# Patient Record
Sex: FEMALE | URBAN - METROPOLITAN AREA
[De-identification: names, ages, dates, MRNs, and addresses within clinical notes are randomized per-mention and may not be internally consistent; named-entity substitution may affect disease eponyms.]

---

## 2019-07-23 ENCOUNTER — APPOINTMENT (RX ONLY)
Dept: URBAN - METROPOLITAN AREA CLINIC 116 | Facility: CLINIC | Age: 74
Setting detail: DERMATOLOGY
End: 2019-07-23

## 2019-07-23 DIAGNOSIS — L80 VITILIGO: ICD-10-CM

## 2019-07-23 DIAGNOSIS — Z71.89 OTHER SPECIFIED COUNSELING: ICD-10-CM

## 2019-07-23 PROBLEM — E78.5 HYPERLIPIDEMIA, UNSPECIFIED: Status: ACTIVE | Noted: 2019-07-23

## 2019-07-23 PROBLEM — L81.9 DISORDER OF PIGMENTATION, UNSPECIFIED: Status: ACTIVE | Noted: 2019-07-23

## 2019-07-23 PROBLEM — E03.9 HYPOTHYROIDISM, UNSPECIFIED: Status: ACTIVE | Noted: 2019-07-23

## 2019-07-23 PROCEDURE — ? PRESCRIPTION

## 2019-07-23 PROCEDURE — 99202 OFFICE O/P NEW SF 15 MIN: CPT

## 2019-07-23 PROCEDURE — ? TREATMENT REGIMEN

## 2019-07-23 PROCEDURE — ? COUNSELING

## 2019-07-23 RX ORDER — TRIAMCINOLONE ACETONIDE 0.1 %
OINTMENT (GRAM) TOPICAL
Qty: 1 | Refills: 2 | Status: ERX | COMMUNITY
Start: 2019-07-23

## 2019-07-23 RX ADMIN — Medication: at 19:45

## 2019-07-23 ASSESSMENT — LOCATION DETAILED DESCRIPTION DERM
LOCATION DETAILED: RIGHT RADIAL DORSAL HAND
LOCATION DETAILED: LEFT PROXIMAL PRETIBIAL REGION
LOCATION DETAILED: RIGHT PROXIMAL DORSAL FOREARM
LOCATION DETAILED: LEFT RADIAL DORSAL HAND
LOCATION DETAILED: RIGHT PROXIMAL PRETIBIAL REGION
LOCATION DETAILED: LEFT PROXIMAL DORSAL FOREARM

## 2019-07-23 ASSESSMENT — LOCATION SIMPLE DESCRIPTION DERM
LOCATION SIMPLE: RIGHT PRETIBIAL REGION
LOCATION SIMPLE: LEFT FOREARM
LOCATION SIMPLE: RIGHT FOREARM
LOCATION SIMPLE: LEFT PRETIBIAL REGION
LOCATION SIMPLE: LEFT HAND
LOCATION SIMPLE: RIGHT HAND

## 2019-07-23 ASSESSMENT — LOCATION ZONE DERM
LOCATION ZONE: LEG
LOCATION ZONE: HAND
LOCATION ZONE: ARM

## 2019-07-23 NOTE — PROCEDURE: TREATMENT REGIMEN
Detail Level: Detailed
Initiate Treatment: - triamcinolone acetonide 0.1 % topical ointment BID, one week on, one week off\\nSig: Apply to affected de-pigmented areas on the body BID, one week on, one week off. Avoid armpits and groin\\n\\n- OTC sun protection / sunscreen -SPF 30+ - Recommended for patient to use sun protection daily
Plan: Discussed diagnosis in detail with patient today. 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 \\nInformed patient that patient’s with this condition do have a family history of autoimmune conditions. \\ndiscussed with patient that this condition can be progressive. \\nInformed patient that this condition cannot be cured but, medications can help to reduce this condition from worsening. Informed patient that currently there is not treatment to help re-pigment the skin. \\n\\nDiscussed using Dermasmooth to help cover the depigmented areas. \\n\\nDiscussed skin transplants with patient today. Informed patient that this treatment is not covered by insurance and would need to be paid out of pocket. \\n\\nInformed patient that she may notice a contrast with her skin color. Informed her that this condition may slowly get whiter and makes the normal pigment on her skin look darker. \\nPatient voiced understanding.
Improved

## 2022-05-18 ENCOUNTER — APPOINTMENT (RX ONLY)
Dept: URBAN - METROPOLITAN AREA CLINIC 116 | Facility: CLINIC | Age: 77
Setting detail: DERMATOLOGY
End: 2022-05-18

## 2022-05-18 DIAGNOSIS — L80 VITILIGO: ICD-10-CM | Status: STABLE

## 2022-05-18 DIAGNOSIS — L28.0 LICHEN SIMPLEX CHRONICUS: ICD-10-CM

## 2022-05-18 DIAGNOSIS — B35.1 TINEA UNGUIUM: ICD-10-CM

## 2022-05-18 DIAGNOSIS — Z71.89 OTHER SPECIFIED COUNSELING: ICD-10-CM

## 2022-05-18 DIAGNOSIS — L82.1 OTHER SEBORRHEIC KERATOSIS: ICD-10-CM

## 2022-05-18 PROBLEM — L30.9 DERMATITIS, UNSPECIFIED: Status: ACTIVE | Noted: 2022-05-18

## 2022-05-18 PROCEDURE — ? TREATMENT REGIMEN

## 2022-05-18 PROCEDURE — ? PRESCRIPTION

## 2022-05-18 PROCEDURE — 99214 OFFICE O/P EST MOD 30 MIN: CPT

## 2022-05-18 PROCEDURE — ? COUNSELING

## 2022-05-18 RX ORDER — CLOBETASOL PROPIONATE 0.5 MG/G
OINTMENT TOPICAL
Qty: 60 | Refills: 0 | Status: ERX | COMMUNITY
Start: 2022-05-18

## 2022-05-18 RX ADMIN — CLOBETASOL PROPIONATE: 0.5 OINTMENT TOPICAL at 00:00

## 2022-05-18 ASSESSMENT — LOCATION ZONE DERM
LOCATION ZONE: LEG
LOCATION ZONE: HAND
LOCATION ZONE: TRUNK
LOCATION ZONE: TOE
LOCATION ZONE: ARM

## 2022-05-18 ASSESSMENT — LOCATION DETAILED DESCRIPTION DERM
LOCATION DETAILED: RIGHT PROXIMAL DORSAL FOREARM
LOCATION DETAILED: LEFT RADIAL DORSAL HAND
LOCATION DETAILED: LEFT DISTAL PRETIBIAL REGION
LOCATION DETAILED: RIGHT PROXIMAL PRETIBIAL REGION
LOCATION DETAILED: LEFT SUPERIOR UPPER BACK
LOCATION DETAILED: LEFT PROXIMAL PRETIBIAL REGION
LOCATION DETAILED: LEFT PROXIMAL DORSAL FOREARM
LOCATION DETAILED: LEFT DORSAL GREAT TOE
LOCATION DETAILED: RIGHT RADIAL DORSAL HAND

## 2022-05-18 ASSESSMENT — LOCATION SIMPLE DESCRIPTION DERM
LOCATION SIMPLE: LEFT UPPER BACK
LOCATION SIMPLE: LEFT GREAT TOE
LOCATION SIMPLE: RIGHT HAND
LOCATION SIMPLE: LEFT HAND
LOCATION SIMPLE: LEFT PRETIBIAL REGION
LOCATION SIMPLE: LEFT FOREARM
LOCATION SIMPLE: RIGHT FOREARM
LOCATION SIMPLE: RIGHT PRETIBIAL REGION

## 2022-05-18 NOTE — PROCEDURE: TREATMENT REGIMEN
Detail Level: Detailed
Initiate Treatment: - triamcinolone acetonide 0.1 % topical ointment BID, one week on, one week off\\nSig: Apply to affected de-pigmented areas on the body BID, one week on, one week off. Avoid armpits and groin\\n\\n- OTC sun protection / sunscreen -SPF 30+ - Recommended for patient to use sun protection daily
Plan: Discussed diagnosis in detail with patient today. 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 \\nInformed patient that patient’s with this condition do have a family history of autoimmune conditions. \\ndiscussed with patient that this condition can be progressive. \\nInformed patient that this condition cannot be cured but, medications can help to reduce this condition from worsening. Informed patient that currently there is not treatment to help re-pigment the skin. \\n\\nDiscussed using Dermasmooth to help cover the depigmented areas. \\n\\nDiscussed skin transplants with patient today. Informed patient that this treatment is not covered by insurance and would need to be paid out of pocket. \\n\\nInformed patient that she may notice a contrast with her skin color. Informed her that this condition may slowly get whiter and makes the normal pigment on her skin look darker. \\nPatient voiced understanding.